# Patient Record
Sex: MALE | Race: WHITE | HISPANIC OR LATINO | URBAN - METROPOLITAN AREA
[De-identification: names, ages, dates, MRNs, and addresses within clinical notes are randomized per-mention and may not be internally consistent; named-entity substitution may affect disease eponyms.]

---

## 2023-05-08 ENCOUNTER — HOSPITAL ENCOUNTER (EMERGENCY)
Facility: HOSPITAL | Age: 38
Discharge: HOME | End: 2023-05-08
Attending: INTERNAL MEDICINE
Payer: COMMERCIAL

## 2023-05-08 VITALS
HEART RATE: 81 BPM | BODY MASS INDEX: 33.32 KG/M2 | WEIGHT: 200 LBS | RESPIRATION RATE: 17 BRPM | TEMPERATURE: 98 F | OXYGEN SATURATION: 99 % | SYSTOLIC BLOOD PRESSURE: 133 MMHG | DIASTOLIC BLOOD PRESSURE: 71 MMHG | HEIGHT: 65 IN

## 2023-05-08 DIAGNOSIS — N28.1 RENAL CYST: ICD-10-CM

## 2023-05-08 DIAGNOSIS — R11.0 NAUSEA: ICD-10-CM

## 2023-05-08 DIAGNOSIS — R10.11 RUQ PAIN: Primary | ICD-10-CM

## 2023-05-08 DIAGNOSIS — R19.7 DIARRHEA, UNSPECIFIED TYPE: ICD-10-CM

## 2023-05-08 LAB
ALBUMIN SERPL BCP-MCNC: 4.2 G/DL (ref 3.5–5.2)
ALP SERPL-CCNC: 90 U/L (ref 55–135)
ALT SERPL W/O P-5'-P-CCNC: 102 U/L (ref 10–44)
ANION GAP SERPL CALC-SCNC: 10 MMOL/L (ref 8–16)
AST SERPL-CCNC: 45 U/L (ref 10–40)
BASOPHILS # BLD AUTO: 0.04 K/UL (ref 0–0.2)
BASOPHILS NFR BLD: 0.4 % (ref 0–1.9)
BILIRUB SERPL-MCNC: 1 MG/DL (ref 0.1–1)
BILIRUB UR QL STRIP: NEGATIVE
BUN SERPL-MCNC: 17 MG/DL (ref 6–20)
CALCIUM SERPL-MCNC: 8.6 MG/DL (ref 8.7–10.5)
CHLORIDE SERPL-SCNC: 106 MMOL/L (ref 95–110)
CLARITY UR: CLEAR
CO2 SERPL-SCNC: 24 MMOL/L (ref 23–29)
COLOR UR: YELLOW
CREAT SERPL-MCNC: 0.9 MG/DL (ref 0.5–1.4)
DIFFERENTIAL METHOD: ABNORMAL
EOSINOPHIL # BLD AUTO: 0.1 K/UL (ref 0–0.5)
EOSINOPHIL NFR BLD: 1.2 % (ref 0–8)
ERYTHROCYTE [DISTWIDTH] IN BLOOD BY AUTOMATED COUNT: 12.2 % (ref 11.5–14.5)
EST. GFR  (NO RACE VARIABLE): >60 ML/MIN/1.73 M^2
GLUCOSE SERPL-MCNC: 110 MG/DL (ref 70–110)
GLUCOSE UR QL STRIP: NEGATIVE
HCT VFR BLD AUTO: 43.8 % (ref 40–54)
HGB BLD-MCNC: 15.7 G/DL (ref 14–18)
HGB UR QL STRIP: ABNORMAL
IMM GRANULOCYTES # BLD AUTO: 0.08 K/UL (ref 0–0.04)
IMM GRANULOCYTES NFR BLD AUTO: 0.8 % (ref 0–0.5)
KETONES UR QL STRIP: NEGATIVE
LEUKOCYTE ESTERASE UR QL STRIP: NEGATIVE
LIPASE SERPL-CCNC: 34 U/L (ref 4–60)
LYMPHOCYTES # BLD AUTO: 1.2 K/UL (ref 1–4.8)
LYMPHOCYTES NFR BLD: 12.2 % (ref 18–48)
MCH RBC QN AUTO: 31.1 PG (ref 27–31)
MCHC RBC AUTO-ENTMCNC: 35.8 G/DL (ref 32–36)
MCV RBC AUTO: 87 FL (ref 82–98)
MONOCYTES # BLD AUTO: 0.8 K/UL (ref 0.3–1)
MONOCYTES NFR BLD: 8.1 % (ref 4–15)
NEUTROPHILS # BLD AUTO: 7.5 K/UL (ref 1.8–7.7)
NEUTROPHILS NFR BLD: 77.3 % (ref 38–73)
NITRITE UR QL STRIP: NEGATIVE
NRBC BLD-RTO: 0 /100 WBC
PH UR STRIP: 6 [PH] (ref 5–8)
PLATELET # BLD AUTO: 306 K/UL (ref 150–450)
PMV BLD AUTO: 10.1 FL (ref 9.2–12.9)
POTASSIUM SERPL-SCNC: 3.9 MMOL/L (ref 3.5–5.1)
PROT SERPL-MCNC: 7.5 G/DL (ref 6–8.4)
PROT UR QL STRIP: ABNORMAL
RBC # BLD AUTO: 5.05 M/UL (ref 4.6–6.2)
SODIUM SERPL-SCNC: 140 MMOL/L (ref 136–145)
SP GR UR STRIP: 1.03 (ref 1–1.03)
URN SPEC COLLECT METH UR: ABNORMAL
UROBILINOGEN UR STRIP-ACNC: NEGATIVE EU/DL
WBC # BLD AUTO: 9.76 K/UL (ref 3.9–12.7)

## 2023-05-08 PROCEDURE — 96375 TX/PRO/DX INJ NEW DRUG ADDON: CPT

## 2023-05-08 PROCEDURE — 96361 HYDRATE IV INFUSION ADD-ON: CPT

## 2023-05-08 PROCEDURE — 81003 URINALYSIS AUTO W/O SCOPE: CPT | Performed by: NURSE PRACTITIONER

## 2023-05-08 PROCEDURE — 63600175 PHARM REV CODE 636 W HCPCS: Performed by: PHYSICIAN ASSISTANT

## 2023-05-08 PROCEDURE — 96374 THER/PROPH/DIAG INJ IV PUSH: CPT | Mod: 59

## 2023-05-08 PROCEDURE — 25000003 PHARM REV CODE 250: Performed by: NURSE PRACTITIONER

## 2023-05-08 PROCEDURE — 83690 ASSAY OF LIPASE: CPT | Performed by: NURSE PRACTITIONER

## 2023-05-08 PROCEDURE — 63600175 PHARM REV CODE 636 W HCPCS: Performed by: NURSE PRACTITIONER

## 2023-05-08 PROCEDURE — 25500020 PHARM REV CODE 255: Performed by: INTERNAL MEDICINE

## 2023-05-08 PROCEDURE — 85025 COMPLETE CBC W/AUTO DIFF WBC: CPT | Performed by: NURSE PRACTITIONER

## 2023-05-08 PROCEDURE — 96376 TX/PRO/DX INJ SAME DRUG ADON: CPT

## 2023-05-08 PROCEDURE — 99285 EMERGENCY DEPT VISIT HI MDM: CPT | Mod: 25

## 2023-05-08 PROCEDURE — 80053 COMPREHEN METABOLIC PANEL: CPT | Performed by: NURSE PRACTITIONER

## 2023-05-08 RX ORDER — ONDANSETRON 4 MG/1
4 TABLET, ORALLY DISINTEGRATING ORAL EVERY 6 HOURS PRN
Qty: 15 TABLET | Refills: 0 | Status: SHIPPED | OUTPATIENT
Start: 2023-05-08 | End: 2023-05-13

## 2023-05-08 RX ORDER — PROMETHAZINE HYDROCHLORIDE 25 MG/1
25 TABLET ORAL EVERY 6 HOURS PRN
Qty: 20 TABLET | Refills: 0 | Status: SHIPPED | OUTPATIENT
Start: 2023-05-08 | End: 2023-05-13

## 2023-05-08 RX ORDER — MAG HYDROX/ALUMINUM HYD/SIMETH 200-200-20
30 SUSPENSION, ORAL (FINAL DOSE FORM) ORAL
Qty: 354 ML | Refills: 0 | Status: SHIPPED | OUTPATIENT
Start: 2023-05-08 | End: 2023-05-15

## 2023-05-08 RX ORDER — KETOROLAC TROMETHAMINE 30 MG/ML
15 INJECTION, SOLUTION INTRAMUSCULAR; INTRAVENOUS
Status: COMPLETED | OUTPATIENT
Start: 2023-05-08 | End: 2023-05-08

## 2023-05-08 RX ORDER — ONDANSETRON 2 MG/ML
4 INJECTION INTRAMUSCULAR; INTRAVENOUS
Status: COMPLETED | OUTPATIENT
Start: 2023-05-08 | End: 2023-05-08

## 2023-05-08 RX ORDER — PANTOPRAZOLE SODIUM 40 MG/1
40 TABLET, DELAYED RELEASE ORAL DAILY
Qty: 30 TABLET | Refills: 0 | Status: SHIPPED | OUTPATIENT
Start: 2023-05-08 | End: 2023-06-07

## 2023-05-08 RX ORDER — DICYCLOMINE HYDROCHLORIDE 20 MG/1
20 TABLET ORAL 4 TIMES DAILY PRN
Qty: 28 TABLET | Refills: 0 | Status: SHIPPED | OUTPATIENT
Start: 2023-05-08 | End: 2023-05-15

## 2023-05-08 RX ORDER — DICYCLOMINE HYDROCHLORIDE 10 MG/ML
20 INJECTION INTRAMUSCULAR
Status: DISCONTINUED | OUTPATIENT
Start: 2023-05-08 | End: 2023-05-08 | Stop reason: HOSPADM

## 2023-05-08 RX ADMIN — KETOROLAC TROMETHAMINE 15 MG: 30 INJECTION, SOLUTION INTRAMUSCULAR; INTRAVENOUS at 06:05

## 2023-05-08 RX ADMIN — SODIUM CHLORIDE 1000 ML: 9 INJECTION, SOLUTION INTRAVENOUS at 04:05

## 2023-05-08 RX ADMIN — IOHEXOL 100 ML: 350 INJECTION, SOLUTION INTRAVENOUS at 05:05

## 2023-05-08 RX ADMIN — ONDANSETRON 4 MG: 2 INJECTION INTRAMUSCULAR; INTRAVENOUS at 06:05

## 2023-05-08 RX ADMIN — ONDANSETRON 4 MG: 2 INJECTION INTRAMUSCULAR; INTRAVENOUS at 04:05

## 2023-05-08 NOTE — ED PROVIDER NOTES
Encounter Date: 5/8/2023       History     Chief Complaint   Patient presents with    Abdominal Pain    Chills     RUQ abdominal pain that started on Friday, reports n/v/d and chills. X1 episode of emesis during triage.      Chief complaint: Right upper quadrant abdominal pain    History of present illness:  Patient is a 38-year-old male who states that 2 days ago he began experience diarrhea with abdominal pain the entire day yesterday he felt fine and now the stomach pain has returned.  He reports nausea without vomiting.  Denies blood present in the diarrhea.  Reports the pain is intermittent and aching.  Currently a 5/10 severity.  Taken no medications or treatments for this issue.  He endorses chills only.    The history is provided by the patient. The history is limited by a language barrier. A  was used ([id number] Guero).   Review of patient's allergies indicates:  No Known Allergies  History reviewed. No pertinent past medical history.  History reviewed. No pertinent surgical history.  History reviewed. No pertinent family history.  Social History     Tobacco Use    Smoking status: Never    Smokeless tobacco: Never   Substance Use Topics    Alcohol use: Never    Drug use: Never     Review of Systems   Constitutional:  Positive for chills. Negative for appetite change, diaphoresis, fatigue and fever.   HENT:  Negative for congestion, ear discharge, ear pain, postnasal drip, rhinorrhea, sinus pressure, sneezing, sore throat and voice change.    Eyes:  Negative for discharge, itching and visual disturbance.   Respiratory:  Negative for cough, shortness of breath and wheezing.    Cardiovascular:  Negative for chest pain, palpitations and leg swelling.   Gastrointestinal:  Positive for abdominal pain, diarrhea, nausea and vomiting.   Endocrine: Negative for polydipsia, polyphagia and polyuria.   Genitourinary:  Negative for difficulty urinating, dysuria, frequency, hematuria, penile discharge,  penile pain, penile swelling and urgency.   Musculoskeletal:  Negative for arthralgias and myalgias.   Skin:  Negative for rash and wound.   Neurological:  Negative for dizziness, seizures, syncope and weakness.   Hematological:  Negative for adenopathy. Does not bruise/bleed easily.   Psychiatric/Behavioral:  Negative for agitation and self-injury. The patient is not nervous/anxious.      Physical Exam     Initial Vitals [05/08/23 0327]   BP Pulse Resp Temp SpO2   137/73 88 20 98.1 °F (36.7 °C) 99 %      MAP       --         Physical Exam    Nursing note and vitals reviewed.  Constitutional: He appears well-developed and well-nourished. He is not diaphoretic. No distress.   HENT:   Head: Normocephalic and atraumatic.   Right Ear: External ear normal.   Left Ear: External ear normal.   Nose: Nose normal.   Eyes: Pupils are equal, round, and reactive to light. Right eye exhibits no discharge. Left eye exhibits no discharge. No scleral icterus.   Neck:   Normal range of motion.  Pulmonary/Chest: No respiratory distress.   Abdominal: Abdomen is soft. Bowel sounds are normal. He exhibits no distension. There is abdominal tenderness in the right upper quadrant.   Musculoskeletal:         General: Normal range of motion.      Cervical back: Normal range of motion.     Neurological: He is alert and oriented to person, place, and time.   Skin: Skin is dry. Capillary refill takes less than 2 seconds.       ED Course   Procedures  Labs Reviewed   CBC W/ AUTO DIFFERENTIAL - Abnormal; Notable for the following components:       Result Value    MCH 31.1 (*)     Immature Granulocytes 0.8 (*)     Immature Grans (Abs) 0.08 (*)     Gran % 77.3 (*)     Lymph % 12.2 (*)     All other components within normal limits   COMPREHENSIVE METABOLIC PANEL - Abnormal; Notable for the following components:    Calcium 8.6 (*)     AST 45 (*)      (*)     All other components within normal limits   URINALYSIS, REFLEX TO URINE CULTURE -  Abnormal; Notable for the following components:    Protein, UA Trace (*)     Occult Blood UA Trace (*)     All other components within normal limits    Narrative:     Specimen Source->Urine   LIPASE          Imaging Results              CT Abdomen Pelvis With Contrast (Final result)  Result time 05/08/23 06:20:30      Final result by Kali Bartlett MD (05/08/23 06:20:30)                   Impression:      1. No definite acute findings identified in the abdomen or pelvis to account for the patient's reported symptoms.  2. Bilateral renal cysts are noted.  Note that complexity/septations associated with a right midpole lesion seen on earlier same day abdominal ultrasound not well characterized by current CT technique.  As such, continued surveillance with ultrasound versus MRI of this lesion would be recommended.  3. Additional details, as provided in the body report.      Electronically signed by: Kali Bartlett  Date:    05/08/2023  Time:    06:20               Narrative:    EXAMINATION:  CT ABDOMEN PELVIS WITH CONTRAST    CLINICAL HISTORY:  Abdominal pain, acute, nonlocalized;    TECHNIQUE:  Low dose axial images, sagittal and coronal reformations were obtained from the lung bases to the pubic symphysis following the IV administration of 100 mL of Omnipaque 350    COMPARISON:  Abdominal ultrasound 05/08/2023.    FINDINGS:  Lower chest: Unremarkable.    Liver: Normal contour.  Findings in keeping with hepatic steatosis.    Gallbladder and bile ducts: Unremarkable. No biliary ductal dilatation.    Pancreas: Unremarkable.    Spleen: Unremarkable.    Adrenals: Unremarkable.    Kidneys: Several simple cysts are seen in both kidneys.  Additional subcentimeter hypoattenuating lesions are too small to definitely characterize.  Complexity/septations associated with lower of the midpole cystic lesions seen on earlier same day abdominal ultrasound not well characterized by current CT technique.    Lymph nodes: No  abdominal or pelvic lymphadenopathy.    Bowel and mesentery: Small hiatal hernia.  No evidence of bowel obstruction.  Normal appendix.    Abdominal aorta: Unremarkable.    Inferior vena cava: Unremarkable.    Free fluid or free air: None.    Pelvis: Unremarkable.    Body wall: Unremarkable.    Bones: Unremarkable.                                       US Abdomen Limited (Final result)  Result time 05/08/23 05:08:19      Final result by Marissa Mota MD (05/08/23 05:08:19)                   Impression:      1. No sonographic evidence of cholelithiasis.  2. Findings suggestive of hepatic steatosis and focal fatty sparing about the gallbladder fossa.  Correlation with LFTs recommended.  3. Incidentally noted complex/septated right renal cyst.  Further assessment with nonemergent dedicated renal ultrasound advised.      Electronically signed by: Marissa Mota MD  Date:    05/08/2023  Time:    05:08               Narrative:    EXAMINATION:  US ABDOMEN LIMITED    CLINICAL HISTORY:  ruq abd pain with tenderness;    TECHNIQUE:  Limited ultrasound of the right upper quadrant of the abdomen focused on the biliary system was performed.    COMPARISON:  None    FINDINGS:  Gallbladder: No sonographic evidence of discrete cholelithiasis.  No gallbladder wall thickening or gallbladder wall hyperemia.  Sonographic James sign is reported to be negative by the ultrasound technologist.    Biliary system: The common duct is not dilated, measuring 2 mm.  No intrahepatic ductal dilatation.    Pancreas: Partially obscured by bowel gas artifact.  Unremarkable in its visualized extent.    Miscellaneous: No upper abdominal ascites.  The liver appears enlarged with increased echogenicity suggestive of hepatic steatosis.  There is a more focal hypoechoic region in the region of the gallbladder fossa measuring approximately 1.7 cm suggestive of focal fatty sparing.    Incidental note is made of a complex/septated cyst in the midpole of the  right kidney.                                       Medications   sodium chloride 0.9% bolus 1,000 mL 1,000 mL (0 mLs Intravenous Stopped 5/8/23 5626)   ondansetron injection 4 mg (4 mg Intravenous Given 5/8/23 7065)   iohexoL (OMNIPAQUE 350) injection 100 mL (100 mLs Intravenous Given 5/8/23 0532)   ondansetron injection 4 mg (4 mg Intravenous Given 5/8/23 0644)   ketorolac injection 15 mg (15 mg Intravenous Given 5/8/23 0644)     Medical Decision Making:   Clinical Tests:   Lab Tests: Ordered and Reviewed  Radiological Study: Ordered and Reviewed  ED Management:  AMN [id number] use discussed results with patient.  He is a 38-year-old male presenting for 2 day history of epigastric right upper quadrant pain with nausea and diarrhea.  Has had a sick contact his daughter who has had nausea vomiting recently.  He denies recent antibiotic use or travel.  Patient is afebrile nontoxic appearing no distress.  Exam above.  Does have mild epigastric and right upper quadrant tenderness palpation.  I assumed care of the patient from [facility] NP CBC without leukocytosis or significant anemia.  CMP with no significant electrolyte abnormality renal insufficiency.  UA with trace protein and trace blood.  Will have patient follow up with Urology regarding this.  Patient is given IV fluids, Bentyl IM and Zofran IV with improvement of his symptoms.  Ultrasound with no evidence of gallstones.  Does show hepatic steatosis.  Discussed this with the patient.  CT abdomen pelvis was ordered.  No evidence of bowel obstruction or acute surgical abdomen.  Lipase normal.  Doubt pancreatitis.  Bilateral renal cysts noted.  Provided patient with a copy of his CT results in labs and will have him follow up with his primary care and Gastroenterology.  He does still have some mild epigastric and right upper quadrant tenderness remaining.  However, I doubt acute abdomen.  Think this may be gastritis versus GERD versus peptic ulcer disease versus  viral gastroenteritis.  Will discharge with medications for symptomatic treatment have him return ER for worsening or as needed.  Medical Decision Making  MEDICAL DECISION MAKING    Initial Assessment:  38-year-old Stateless-speaking male presents the emergency department complaining of right upper abdominal pain that started 2 days ago with associated nausea.  On physical exam he is afebrile nontoxic in no apparent distress there is tenderness in the right upper quadrant only.    Differential Diagnosis:  Cholecystitis cholelithiasis pancreatitis    Diagnostic Plan:  Labs and ultrasound    See ED Course Below for Interpretation and Time Stamped Events         Amount and/or Complexity of Data Reviewed  Labs: ordered. Decision-making details documented in ED Course.  Radiology: ordered. Decision-making details documented in ED Course.     Details: Following result of ultrasonography I decided to obtain CT to ensure absence of pancreas inflammation.  Lipase is normal.                 ED Course as of 05/08/23 1552   Mon May 08, 2023   0352 BP: 137/73 [VC]   0352 Temp: 98.1 °F (36.7 °C) [VC]   0352 Temp Source: Oral [VC]   0352 Pulse: 88 [VC]   0352 Resp: 20 [VC]   0352 SpO2: 99 % [VC]   0434 CBC auto differential(!)  Normal cbc. [VC]   0504 Urinalysis, Reflex to Urine Culture Urine, Clean Catch(!)  Negative for uti. [VC]   0505 Lipase: 34 [VC]   0505 Comprehensive metabolic panel(!)  Essentially normal cmp with exception of mild elevation of the ast and alt   [VC]   0513 US Abdomen Limited  1. No sonographic evidence of cholelithiasis.  2. Findings suggestive of hepatic steatosis and focal fatty sparing about the gallbladder fossa.  Correlation with LFTs recommended.  3. Incidentally noted complex/septated right renal cyst [VC]   0540 Sbar given to mignon mo, my care ends now. [VC]      ED Course User Index  [VC] Darvin Shea DNP                 Clinical Impression:   Final diagnoses:  [R10.11] RUQ pain  (Primary)  [R11.0] Nausea  [R19.7] Diarrhea, unspecified type  [N28.1] Renal cyst        ED Disposition Condition    Discharge Stable          ED Prescriptions       Medication Sig Dispense Start Date End Date Auth. Provider    dicyclomine (BENTYL) 20 mg tablet Take 1 tablet (20 mg total) by mouth 4 (four) times daily as needed (for abdominal cramping). 28 tablet 5/8/2023 5/15/2023 Mayi Rodrigues PA-C    ondansetron (ZOFRAN-ODT) 4 MG TbDL Take 1 tablet (4 mg total) by mouth every 6 (six) hours as needed (for nausea). 15 tablet 5/8/2023 5/13/2023 Mayi Rodrigues PA-C    promethazine (PHENERGAN) 25 MG tablet Take 1 tablet (25 mg total) by mouth every 6 (six) hours as needed for Nausea. MAY CAUSE DROWSINESS 20 tablet 5/8/2023 5/13/2023 Mayi Rodrigues PA-C    pantoprazole (PROTONIX) 40 MG tablet Take 1 tablet (40 mg total) by mouth once daily. 30 tablet 5/8/2023 6/7/2023 Mayi Rodrigues PA-C    aluminum-magnesium hydroxide-simethicone (MAALOX ADVANCED) 200-200-20 mg/5 mL Susp Take 30 mLs by mouth 4 (four) times daily before meals and nightly. for 7 days 354 mL 5/8/2023 5/15/2023 Mayi Rodrigues PA-C          Follow-up Information       Follow up With Specialties Details Why Contact Info    Aaron Hernandez MD Gastroenterology Schedule an appointment as soon as possible for a visit in 2 days for follow up [address] [facility] [address]  [phone]      [facility] Emergency Dept Emergency Medicine Go to  As needed, If symptoms worsen [id number]  [facility] [id number] [phone]             Mayi Rodrigues PA-C  05/08/23 0129

## 2023-05-08 NOTE — DISCHARGE INSTRUCTIONS

## 2023-05-08 NOTE — Clinical Note
"Benjamin Millerrivas Camacho was seen and treated in our emergency department on 5/8/2023.  He may return to work on 05/09/2023.       If you have any questions or concerns, please don't hesitate to call.      Mayi Rodrigues PA-C"